# Patient Record
Sex: MALE | Race: WHITE | NOT HISPANIC OR LATINO | Employment: OTHER | URBAN - METROPOLITAN AREA
[De-identification: names, ages, dates, MRNs, and addresses within clinical notes are randomized per-mention and may not be internally consistent; named-entity substitution may affect disease eponyms.]

---

## 2023-10-03 ENCOUNTER — OFFICE VISIT (OUTPATIENT)
Dept: DERMATOLOGY | Facility: CLINIC | Age: 78
End: 2023-10-03
Payer: MEDICARE

## 2023-10-03 DIAGNOSIS — Z51.89 VISIT FOR WOUND CHECK: ICD-10-CM

## 2023-10-03 PROCEDURE — 99024 POSTOP FOLLOW-UP VISIT: CPT | Performed by: DERMATOLOGY

## 2023-10-03 NOTE — PROGRESS NOTES
1. Visit for wound check  Mid Forehead    Office Follow Up Note    Visit Summary  Chief Complaint    1. Complaint Wound check.    On exam,  Mr. Jordan is well-appearing and in no apparent distress. The surgical site appears clean with minimal to no erythema.  Small erosion centrally.      Area healing well, reassured patient.   Recommend ointment and a bandage centrally until fully healed and sun protection.    The patient was advised on the importance of sun protection and routine skin monitoring and instructed to call with any further concerns. The patient will return in 2 weeks or sooner if needed.

## 2023-10-03 NOTE — PROGRESS NOTES
1. Visit for wound check  Mid Forehead    Office Follow Up Note    Visit Summary  Chief Complaint    1. Complaint Wound check.    Chris Jordan is a 78 y.o. male who presents for *** week follow up after surgery for a {MOHS EXCISION SKIN CANCER:37076}. The patient has no concerns today.     Location Operation site location: Mid Forehead    On exam,  Mr. Jordan is well-appearing and in no apparent distress. The surgical site appears clean with minimal to no erythema. No tenderness and good wound edge apposition. Center wound granulating.  Assessment and Plan:  History of skin cancer requiring ongoing monitoring for recurrence and additional lesion development.   The patient was reassured that the wound is healing appropriately.   Plan is to continue Vaseline daily until healed  The dressing was removed, the wound cleaned a a new dressing reapplied.     The patient was advised on the importance of sun protection and routine skin monitoring and instructed to call with any further concerns. The patient will return in 2 weeks.

## 2023-10-17 ENCOUNTER — OFFICE VISIT (OUTPATIENT)
Dept: DERMATOLOGY | Facility: CLINIC | Age: 78
End: 2023-10-17
Payer: MEDICARE

## 2023-10-17 DIAGNOSIS — Z51.89 VISIT FOR WOUND CHECK: ICD-10-CM

## 2023-10-17 PROCEDURE — 99024 POSTOP FOLLOW-UP VISIT: CPT | Performed by: DERMATOLOGY

## 2023-10-17 NOTE — PROGRESS NOTES
Office Follow Up Note    Visit Summary  Chief Complaint    1. Complaint Wound check.    Chris Jordan is a 78 y.o. male who presents for 4 week follow up after surgery for a basal cell carcinoma. The patient has no concerns today.     Location Operation site location: Right Center of Forehead    On exam,  Mr. Jordan is well-appearing and in no apparent distress. The surgical site appears clean with minimal to no erythema. No tenderness and good wound edge apposition.    Assessment and Plan:  History of skin cancer requiring ongoing monitoring for recurrence and additional lesion development.   The patient was reassured that the wound is healing appropriately.   The dressing was removed, the wound cleaned a a new dressing reapplied.     The patient was advised on the importance of sun protection and routine skin monitoring and instructed to call with any further concerns. The patient will return as needed    Healing well, sun protection.  Silicone okay

## 2025-09-17 ENCOUNTER — APPOINTMENT (OUTPATIENT)
Dept: DERMATOLOGY | Facility: CLINIC | Age: 80
End: 2025-09-17
Payer: MEDICARE

## 2025-10-07 ENCOUNTER — APPOINTMENT (OUTPATIENT)
Dept: DERMATOLOGY | Facility: CLINIC | Age: 80
End: 2025-10-07
Payer: MEDICARE

## 2025-10-23 ENCOUNTER — APPOINTMENT (OUTPATIENT)
Dept: DERMATOLOGY | Facility: CLINIC | Age: 80
End: 2025-10-23
Payer: MEDICARE